# Patient Record
Sex: FEMALE
[De-identification: names, ages, dates, MRNs, and addresses within clinical notes are randomized per-mention and may not be internally consistent; named-entity substitution may affect disease eponyms.]

---

## 2021-07-13 ENCOUNTER — NURSE TRIAGE (OUTPATIENT)
Dept: OTHER | Facility: CLINIC | Age: 71
End: 2021-07-13

## 2021-07-13 NOTE — TELEPHONE ENCOUNTER
Brief description of triage: 67 y/o with open  Sore size of eraser blister  Erupted  On   Right ankle where pt had previous hip surgery a week ago. Pt denies any drainage swelling or tenderness she states the skin is open and she is anxious about  mrsa  Or an infection     Triage indicates for patient to seen in office  Today   Or  Tomorrow     Care advice provided, patient verbalizes understanding; denies any other questions or concerns; instructed to call back for any new or worsening symptoms. This triage is a result of a call to [address] [place] [address]. Please do not respond to the triage nurse through this encounter. Any subsequent communication should be directly with the patient. Reason for Disposition   Caller can't describe it clearly    Answer Assessment - Initial Assessment Questions  1. APPEARANCE of LESION: \"What does it look like? \"       Blister    2. SIZE: \"How big is it? \" (e.g., compare to size of pinhead, tip of pen, eraser, coin, pea, grape, ping pong ball; or size in cms or inches)       Size of an eraser     3. COLOR: \"What color is it? \" \"Is there more than one color? \"     Red    4. SHAPE: \"What shape is it? \" (e.g., round, irregular)     Round     5. RAISED: \"Does it stick up above the skin or is it flat? \" (e.g., raised or elevated)      Skin hanging off     6. TENDER: \"Does it hurt when you touch it? \"  (Scale 1-10; or mild, moderate, severe)      Unsure     7. LOCATION: \"Where is it located? \"       Ankle    8. ONSET: \"When did it first appear? \"       5 days    9. NUMBER: \"Is there just one? \" or \"Are there others? \"      Just one     10. CAUSE: \"What do you think it is? \"       Unsure     11. OTHER SYMPTOMS: \"Do you have any other symptoms? \" (e.g., fever)        No   12. PREGNANCY: \"Is there any chance you are pregnant? \" \"When was your last menstrual period? \"        No    Protocols used: SKIN LESION - MOLES OR GROWTHS-ADULT-[place]

## 2022-12-02 ENCOUNTER — NURSE TRIAGE (OUTPATIENT)
Dept: OTHER | Facility: CLINIC | Age: 72
End: 2022-12-02

## 2022-12-02 NOTE — TELEPHONE ENCOUNTER
Location of patient: [place]    Subjective: Caller states \"not feeling good\"     Current Symptoms: about 3 days now  Monday night got flu shot  Tuesday feeling off  Wednesday was coughing uncontrollably that ribs and back were hurting  Also chills and fever  Diarrhea started today  When coughing will lose control of bladder  Slight headache  Not many body aches    Onset: 3 days ago; gradual, worsening    Associated Symptoms: NA    Pain Severity:     Temperature: 100.2 today, yesterday was 98.9     What has been tried: nothing    LMP: NA Pregnant: NA    Recommended disposition: Mercedes Rosado 9262 advice provided, patient verbalizes understanding; denies any other questions or concerns; instructed to call back for any new or worsening symptoms. Will treat at home and call PCP or go to [facility] if worsens. This triage is a result of a call to [address] [place] [address]. Please do not respond to the triage nurse through this encounter. Any subsequent communication should be directly with the patient.     Reason for Disposition   Cough with no complications    Protocols used: Cough-ADULT-[place]